# Patient Record
Sex: FEMALE | Employment: FULL TIME | URBAN - NONMETROPOLITAN AREA
[De-identification: names, ages, dates, MRNs, and addresses within clinical notes are randomized per-mention and may not be internally consistent; named-entity substitution may affect disease eponyms.]

---

## 2023-12-01 ENCOUNTER — APPOINTMENT (OUTPATIENT)
Dept: RADIOLOGY | Facility: HOSPITAL | Age: 28
End: 2023-12-01
Payer: COMMERCIAL

## 2023-12-01 ENCOUNTER — HOSPITAL ENCOUNTER (EMERGENCY)
Facility: HOSPITAL | Age: 28
Discharge: HOME | End: 2023-12-01
Payer: COMMERCIAL

## 2023-12-01 VITALS
WEIGHT: 119 LBS | SYSTOLIC BLOOD PRESSURE: 114 MMHG | BODY MASS INDEX: 22.47 KG/M2 | RESPIRATION RATE: 14 BRPM | HEART RATE: 70 BPM | HEIGHT: 61 IN | OXYGEN SATURATION: 98 % | TEMPERATURE: 98.2 F | DIASTOLIC BLOOD PRESSURE: 74 MMHG

## 2023-12-01 DIAGNOSIS — T07.XXXA MULTIPLE CONTUSIONS: ICD-10-CM

## 2023-12-01 DIAGNOSIS — Z04.1 EXAM FOLLOWING MVC (MOTOR VEHICLE COLLISION), NO APPARENT INJURY: Primary | ICD-10-CM

## 2023-12-01 DIAGNOSIS — S16.1XXA CERVICAL STRAIN, ACUTE, INITIAL ENCOUNTER: ICD-10-CM

## 2023-12-01 LAB
ALBUMIN SERPL BCP-MCNC: 4.1 G/DL (ref 3.4–5)
ALP SERPL-CCNC: 42 U/L (ref 33–110)
ALT SERPL W P-5'-P-CCNC: 7 U/L (ref 7–45)
ANION GAP SERPL CALC-SCNC: 9 MMOL/L (ref 10–20)
AST SERPL W P-5'-P-CCNC: 12 U/L (ref 9–39)
B-HCG SERPL-ACNC: <2 MIU/ML
BILIRUB SERPL-MCNC: 0.5 MG/DL (ref 0–1.2)
BUN SERPL-MCNC: 14 MG/DL (ref 6–23)
CALCIUM SERPL-MCNC: 9.2 MG/DL (ref 8.6–10.3)
CHLORIDE SERPL-SCNC: 106 MMOL/L (ref 98–107)
CO2 SERPL-SCNC: 26 MMOL/L (ref 21–32)
CREAT SERPL-MCNC: 0.69 MG/DL (ref 0.5–1.05)
ERYTHROCYTE [DISTWIDTH] IN BLOOD BY AUTOMATED COUNT: 13.2 % (ref 11.5–14.5)
ETHANOL SERPL-MCNC: <10 MG/DL
GFR SERPL CREATININE-BSD FRML MDRD: >90 ML/MIN/1.73M*2
GLUCOSE SERPL-MCNC: 88 MG/DL (ref 74–99)
HCT VFR BLD AUTO: 36.5 % (ref 36–46)
HGB BLD-MCNC: 12.1 G/DL (ref 12–16)
MCH RBC QN AUTO: 29.1 PG (ref 26–34)
MCHC RBC AUTO-ENTMCNC: 33.2 G/DL (ref 32–36)
MCV RBC AUTO: 88 FL (ref 80–100)
NRBC BLD-RTO: 0 /100 WBCS (ref 0–0)
PLATELET # BLD AUTO: 306 X10*3/UL (ref 150–450)
POTASSIUM SERPL-SCNC: 4.2 MMOL/L (ref 3.5–5.3)
PROT SERPL-MCNC: 6.9 G/DL (ref 6.4–8.2)
RBC # BLD AUTO: 4.16 X10*6/UL (ref 4–5.2)
SODIUM SERPL-SCNC: 137 MMOL/L (ref 136–145)
WBC # BLD AUTO: 6.7 X10*3/UL (ref 4.4–11.3)

## 2023-12-01 PROCEDURE — 73130 X-RAY EXAM OF HAND: CPT | Mod: BILATERAL PROCEDURE | Performed by: RADIOLOGY

## 2023-12-01 PROCEDURE — 2500000001 HC RX 250 WO HCPCS SELF ADMINISTERED DRUGS (ALT 637 FOR MEDICARE OP): Performed by: PHYSICIAN ASSISTANT

## 2023-12-01 PROCEDURE — 36415 COLL VENOUS BLD VENIPUNCTURE: CPT | Performed by: PHYSICIAN ASSISTANT

## 2023-12-01 PROCEDURE — 73130 X-RAY EXAM OF HAND: CPT | Mod: 50,FY,FR

## 2023-12-01 PROCEDURE — 72170 X-RAY EXAM OF PELVIS: CPT | Mod: FY,FR

## 2023-12-01 PROCEDURE — 73560 X-RAY EXAM OF KNEE 1 OR 2: CPT | Mod: LT,FY,FR

## 2023-12-01 PROCEDURE — 84702 CHORIONIC GONADOTROPIN TEST: CPT | Performed by: PHYSICIAN ASSISTANT

## 2023-12-01 PROCEDURE — 73560 X-RAY EXAM OF KNEE 1 OR 2: CPT | Mod: LEFT SIDE | Performed by: RADIOLOGY

## 2023-12-01 PROCEDURE — 71045 X-RAY EXAM CHEST 1 VIEW: CPT | Mod: FOREIGN READ | Performed by: RADIOLOGY

## 2023-12-01 PROCEDURE — 2500000004 HC RX 250 GENERAL PHARMACY W/ HCPCS (ALT 636 FOR OP/ED): Performed by: PHYSICIAN ASSISTANT

## 2023-12-01 PROCEDURE — 99284 EMERGENCY DEPT VISIT MOD MDM: CPT

## 2023-12-01 PROCEDURE — 72125 CT NECK SPINE W/O DYE: CPT

## 2023-12-01 PROCEDURE — 73030 X-RAY EXAM OF SHOULDER: CPT | Mod: RIGHT SIDE | Performed by: RADIOLOGY

## 2023-12-01 PROCEDURE — 71045 X-RAY EXAM CHEST 1 VIEW: CPT | Mod: FY,FR

## 2023-12-01 PROCEDURE — 72125 CT NECK SPINE W/O DYE: CPT | Performed by: RADIOLOGY

## 2023-12-01 PROCEDURE — 85027 COMPLETE CBC AUTOMATED: CPT | Performed by: PHYSICIAN ASSISTANT

## 2023-12-01 PROCEDURE — 80053 COMPREHEN METABOLIC PANEL: CPT | Performed by: PHYSICIAN ASSISTANT

## 2023-12-01 PROCEDURE — 82077 ASSAY SPEC XCP UR&BREATH IA: CPT | Performed by: PHYSICIAN ASSISTANT

## 2023-12-01 PROCEDURE — 72170 X-RAY EXAM OF PELVIS: CPT | Mod: FOREIGN READ | Performed by: RADIOLOGY

## 2023-12-01 PROCEDURE — 73030 X-RAY EXAM OF SHOULDER: CPT | Mod: RT,FY,FR

## 2023-12-01 RX ORDER — HYDROXYZINE HYDROCHLORIDE 50 MG/1
25 TABLET, FILM COATED ORAL ONCE
Status: DISCONTINUED | OUTPATIENT
Start: 2023-12-01 | End: 2023-12-01 | Stop reason: CLARIF

## 2023-12-01 RX ORDER — IBUPROFEN 600 MG/1
600 TABLET ORAL ONCE
Status: COMPLETED | OUTPATIENT
Start: 2023-12-01 | End: 2023-12-01

## 2023-12-01 RX ORDER — HYDROXYZINE PAMOATE 25 MG/1
25 CAPSULE ORAL ONCE
Status: COMPLETED | OUTPATIENT
Start: 2023-12-01 | End: 2023-12-01

## 2023-12-01 RX ADMIN — IBUPROFEN 600 MG: 600 TABLET ORAL at 17:17

## 2023-12-01 RX ADMIN — HYDROXYZINE PAMOATE 25 MG: 25 CAPSULE ORAL at 17:18

## 2023-12-01 ASSESSMENT — PAIN - FUNCTIONAL ASSESSMENT
PAIN_FUNCTIONAL_ASSESSMENT: 0-10
PAIN_FUNCTIONAL_ASSESSMENT: 0-10

## 2023-12-01 ASSESSMENT — PAIN DESCRIPTION - DESCRIPTORS: DESCRIPTORS: THROBBING

## 2023-12-01 ASSESSMENT — PAIN SCALES - GENERAL
PAINLEVEL_OUTOF10: 8
PAINLEVEL_OUTOF10: 7

## 2023-12-01 ASSESSMENT — COLUMBIA-SUICIDE SEVERITY RATING SCALE - C-SSRS
1. IN THE PAST MONTH, HAVE YOU WISHED YOU WERE DEAD OR WISHED YOU COULD GO TO SLEEP AND NOT WAKE UP?: NO
6. HAVE YOU EVER DONE ANYTHING, STARTED TO DO ANYTHING, OR PREPARED TO DO ANYTHING TO END YOUR LIFE?: NO
2. HAVE YOU ACTUALLY HAD ANY THOUGHTS OF KILLING YOURSELF?: NO

## 2023-12-01 ASSESSMENT — PAIN DESCRIPTION - LOCATION: LOCATION: HEAD

## 2023-12-01 ASSESSMENT — PAIN DESCRIPTION - PROGRESSION: CLINICAL_PROGRESSION: GRADUALLY WORSENING

## 2023-12-01 ASSESSMENT — PAIN DESCRIPTION - FREQUENCY: FREQUENCY: CONSTANT/CONTINUOUS

## 2023-12-01 ASSESSMENT — PAIN DESCRIPTION - ONSET: ONSET: SUDDEN
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
## 2023-12-02 RX ORDER — IBUPROFEN 600 MG/1
600 TABLET ORAL EVERY 6 HOURS PRN
Qty: 30 TABLET | Refills: 0 | Status: SHIPPED | OUTPATIENT
Start: 2023-12-02

## 2023-12-02 RX ORDER — CYCLOBENZAPRINE HCL 10 MG
10 TABLET ORAL 2 TIMES DAILY PRN
Qty: 10 TABLET | Refills: 0 | Status: SHIPPED | OUTPATIENT
Start: 2023-12-02